# Patient Record
Sex: MALE | URBAN - METROPOLITAN AREA
[De-identification: names, ages, dates, MRNs, and addresses within clinical notes are randomized per-mention and may not be internally consistent; named-entity substitution may affect disease eponyms.]

---

## 2022-04-08 ENCOUNTER — HOSPITAL ENCOUNTER (EMERGENCY)
Facility: MEDICAL CENTER | Age: 72
End: 2022-04-08
Attending: EMERGENCY MEDICINE

## 2022-04-08 VITALS
OXYGEN SATURATION: 96 % | HEART RATE: 80 BPM | BODY MASS INDEX: 26.22 KG/M2 | SYSTOLIC BLOOD PRESSURE: 124 MMHG | DIASTOLIC BLOOD PRESSURE: 88 MMHG | HEIGHT: 63 IN | RESPIRATION RATE: 16 BRPM | WEIGHT: 148 LBS | TEMPERATURE: 97.6 F

## 2022-04-08 DIAGNOSIS — M10.00 ACUTE IDIOPATHIC GOUT, UNSPECIFIED SITE: ICD-10-CM

## 2022-04-08 PROCEDURE — 99284 EMERGENCY DEPT VISIT MOD MDM: CPT

## 2022-04-08 PROCEDURE — 700111 HCHG RX REV CODE 636 W/ 250 OVERRIDE (IP): Performed by: EMERGENCY MEDICINE

## 2022-04-08 RX ORDER — METHYLPREDNISOLONE 4 MG/1
TABLET ORAL
Qty: 1 EACH | Refills: 0 | Status: SHIPPED | OUTPATIENT
Start: 2022-04-08

## 2022-04-08 RX ORDER — PREDNISONE 20 MG/1
60 TABLET ORAL ONCE
Status: COMPLETED | OUTPATIENT
Start: 2022-04-08 | End: 2022-04-08

## 2022-04-08 RX ADMIN — PREDNISONE 60 MG: 20 TABLET ORAL at 19:06

## 2022-04-09 NOTE — ED TRIAGE NOTES
Chief Complaint   Patient presents with   • Gout     R ankle pain, denies injury     Pt states hx gout, no meds because no flare ups in 5 years

## 2022-04-09 NOTE — ED PROVIDER NOTES
"ED Provider Note    CHIEF COMPLAINT  Chief Complaint   Patient presents with   • Gout     R ankle pain, denies injury       HPI  Aashish Mohan is a 72 y.o. male who presents to the Emergency Department with a history of gout.  He states his last flare was 5 years ago he used to be on allopurinol for maintenance and then he would take steroids if he had a flare.  He has not had a flare in the last 5 years he is traveling now and starts describing the same pain he normally has in his right ankle he has had no injury feels just like his gout in the past    REVIEW OF SYSTEMS  As above, all other systems negative.  PAST MEDICAL HISTORY   Gout    SOCIAL HISTORY  Here with wife they live in [place]  SURGICAL HISTORY  patient denies any surgical history    CURRENT MEDICATIONS  Reviewed.  See Encounter Summary.      ALLERGIES  Allergies   Allergen Reactions   • Metformin        PHYSICAL EXAM  VITAL SIGNS: /88   Pulse 80   Temp 36.4 °C (97.6 °F)   Resp 16   Ht 1.6 m (5' 3\")   Wt 67.1 kg (148 lb)   SpO2 96%   BMI 26.22 kg/m²   Constitutional: Pleasant, Alert in no apparent distress.  HENT: Normocephalic, Bilateral external ears normal. Nose normal.   Eyes: Pupils are equal and reactive. Conjunctiva normal, non-icteric.   Thorax & Lungs: Easy unlabored respirations  Abdomen:  No gross signs of peritonitis, no pain with movement   Skin: Visualized skin is  Dry, No erythema, No rash.   Extremities:   Right ankle swollen and edematous, no streaking  Neurologic: Alert, Grossly non-focal.   Psychiatric: Affect and Mood normal      COURSE & MEDICAL DECISION MAKING  Nursing notes and vital signs were reviewed. (See chart for details)    The patient presents to the Emergency Department with chief complaint of right ankle pain, history of gout similar to his gout flares in the past he had no traumatic injury his right ankle is swollen.  I do suspect that this is a gouty arthritis, we will give him prednisone " here and place him on a Medrol Dosepak to follow-up with his primary care doctor in [place] when he returns    ge.    The patient is referred to a primary physician for blood pressure management, diabetic screening, and for all other preventative health concerns.        DISPOSITION:  Patient will be discharged home in stable condition.    FOLLOW UP:    Your primary care physician and [place] for ongoing management.          OUTPATIENT MEDICATIONS:  Discharge Medication List as of 4/8/2022  7:00 PM      START taking these medications    Details   methylPREDNISolone (MEDROL DOSEPAK) 4 MG Tablet Therapy Pack Use as directed, Disp-1 Each, R-0, Print Rx Paper              The patient verbally agreed to the discharge precautions and follow-up plan which is documented in EPIC.  DISPOSITION:    Patient will be discharged home in stable condition.    FOLLOW UP:    Your primary care physician and [place] for ongoing management.          OUTPATIENT MEDICATIONS:  Discharge Medication List as of 4/8/2022  7:00 PM      START taking these medications    Details   methylPREDNISolone (MEDROL DOSEPAK) 4 MG Tablet Therapy Pack Use as directed, Disp-1 Each, R-0, Print Rx Paper               FINAL IMPRESSION   1. Acute idiopathic gout, unspecified site